# Patient Record
Sex: FEMALE | URBAN - METROPOLITAN AREA
[De-identification: names, ages, dates, MRNs, and addresses within clinical notes are randomized per-mention and may not be internally consistent; named-entity substitution may affect disease eponyms.]

---

## 2024-05-30 ENCOUNTER — TELEMEDICINE (OUTPATIENT)
Dept: NEUROLOGY | Facility: HOSPITAL | Age: 19
End: 2024-05-30
Payer: COMMERCIAL

## 2024-05-30 DIAGNOSIS — I95.1 ORTHOSTATIC SYNCOPE: ICD-10-CM

## 2024-05-30 DIAGNOSIS — I95.1 ORTHOSTATIC SYNCOPE: Primary | ICD-10-CM

## 2024-05-30 PROCEDURE — 1036F TOBACCO NON-USER: CPT | Performed by: PSYCHIATRY & NEUROLOGY

## 2024-05-30 PROCEDURE — 99215 OFFICE O/P EST HI 40 MIN: CPT | Mod: 95 | Performed by: PSYCHIATRY & NEUROLOGY

## 2024-05-30 PROCEDURE — 99417 PROLNG OP E/M EACH 15 MIN: CPT | Performed by: PSYCHIATRY & NEUROLOGY

## 2024-05-30 PROCEDURE — 99205 OFFICE O/P NEW HI 60 MIN: CPT | Performed by: PSYCHIATRY & NEUROLOGY

## 2024-05-30 RX ORDER — ALBUTEROL SULFATE 90 UG/1
2 AEROSOL, METERED RESPIRATORY (INHALATION) EVERY 4 HOURS PRN
COMMUNITY

## 2024-05-30 RX ORDER — FLUDROCORTISONE ACETATE 0.1 MG/1
1 TABLET ORAL DAILY
COMMUNITY
Start: 2023-07-26

## 2024-05-30 RX ORDER — LEVETIRACETAM 500 MG/1
500 TABLET ORAL 2 TIMES DAILY
COMMUNITY

## 2024-05-30 NOTE — PROGRESS NOTES
"[facility] AUTONOMIC PROGRAM         Makenzie Bridges MD    Professor of Neurology  [facility]  Senior Attending Physician- [facility] [place]  Director, Autonomic Program and Laboratories  Medical Director, [employer] [place] [address]  Office: [phone]  Assistant: Sylvia Nguyen (email: [email or web address])       AUTONOMIC NERVOUS SYSTEM CONSULTATION    Patient Information     Medical Record Number: [id number]   YOB: 2005    Home Address: [address] [place] [address]   Phone Number:  [phone]      Primary Care Physician: No primary care provider on file.    Referring Physician: No referring provider defined for this encounter.    Patient accompanied by: Her mother  In addition to attending physician, patient seen by: Vinh Dietz RN    Clinical Scores    Compass 31 : 26      IMPRESSION:    Sylvia's symptoms can be compatible with the postural orthostatic tachycardia syndrome, even though the latest bout of syncopal episodes happens when she was sitting or lying down in bed, which is unusual for POTS.  But because she has experienced orthostatic lightheadedness and dizziness in the past, POTS is to be worked up.    PLAN/RECOMMENDATIONS:     I will therefore bring her to  to get autonomic testing, an EMG and an etiological blood and urine work.  Those will be done over 2 days in a row.    Given the complaint of orthostatic intolerance, which, in the absence of a definite core cause, we will need to treat symptomatically. I would recommend following the \"5 conservative measures\":   1. Wearing waist-high compression stockings 40-50 mmHg in compression, every day as long she is up and about and removing them at bedtime  2. Drinking one gallon of water a day  3. Eating at least 6 gm of salt (roughly 3 teaspoons) throughout her day.  4. Elevating the head of " her bed 45 degrees (not by stacking pillows, but bending her mattress up and stacking bricks underneath).  5. Jogging in water 1 hour every day    These measures could be tried strictly for a period of 2 to 3 months, and if they do not bring relief, usage of symptomatic pharmacological treatment would then be justified.    She will wean herself off of Keppra and not restart Florinef.      MARII Murillo is a 18 y.o.  female presenting to the  Autonomic Program for the evaluation of orthostatic intolerance and lightheadedness, with episodes of syncope    History details     The most recent syncope happened 3 weeks ago when the patient was standing.  Prior to that, on April 21, the patient experienced about 12 consecutive episodes.  She was trying to get out of bed and every time she would try she would pass out.  This led her to the emergency room where they prescribed Keppra for seizure prophylaxis and Florinef.  She was worked up extensively from the cardiac standpoint and this workup was unremarkable.  Except that when the Holter monitor was placed it recorded heart rate as high as 200 and above beats per minute, that was sinus rhythm.  There may have been a tilt table test done that was reported as negative but the details are not available.    Relevant Past Medical History:    She was diagnosed with asthma.  Retrospectively she thinks that some of those symptoms that were called asthma may have been orthostatic related    Relevant Family History:   The patient is adopted and does not know her biological family  AUTONOMIC REVIEW OF SYSTEMS    COMPASS 31 for research purposes only (see below)  1 1. In the past year, have you ever felt faint, dizzy, “goofy”, or had difficulty thinking soon after standing up from a sitting or lying position? Yes   2 2. When standing up, how frequently do you get these feelings or symptoms? Frequently   1 3. How would you rate the severity of these feelings or  symptoms? Mild   2 4. In the past year, have these feelings or symptoms that you have experienced: Gotten somewhat worse   6     24     0 5. In the past year, have you ever noticed color changes in your skin, such as red, white, or purple? (If you answer no, please skip to question 8) No   0 6. What parts of your body are affected by these color changes?    0 7. Have these changes in your skin color:    0     0     0 8. In the past 5 years, what changes, if any, have occurred in your general body sweating? I haven't noticed any changes in my sweating   0 9. Do your eyes feel excessively dry? No   0 10. Does you mouth feel excessively dry? No   0 11. For the symptom of dry eyes or dry mouth that you have had for the longest period of time, has this symptom: I have not had any of these symptoms   0     0     0 12. In the past year, have you noticed any changes in how quickly you get full when eating a meal? I haven’t noticed any change   0 13. In the past year, have you felt excessively full or persistently full (bloated feeling) after a meal? Never   0 14. In the past year, have you vomited after a meal? Never   0 15. In the past year, have you had a cramping or colicky abdominal pain? Never   0 16. In the past year, have you had any bouts of diarrhea? (If you answer no, please skip to question 20) No   0 17. How frequently does this occur?    0 18. How severe are these bouts of diarrhea?    0 19. Have your bouts of diarrhea gotten:    0 20. In the past year, have you been constipated? (If you answer no, please skip to question 24) No   0 21. How frequently are you constipated?    0 22. How severe are these episodes of constipation?    0 23. Has your constipation gotten:    0     0     0 24. In the past year, have you ever lost control of your bladder function? Never   0 25. In the past year, have you had difficulty passing urine? Never   0 26. In the past year, have you had trouble completely emptying your bladder?  Never   0     0     1 27. In the past year, without sunglasses or tinted glasses, has bright light bothered your eyes? (If you roly never, please skip to question 29) Occasionally   2 28. How severe is this sensitivity to bright light? Moderate   1 29. In the past year, have you had trouble focusing your eyes? (If you roly never, please skip to question 31) Occasionally   1 30. How severe is this focusing problem? Mild   2 31. Has the most troublesome symptom with your eyes (i.e. sensitivity to bright light or trouble focusing) gotten: Gotten somewhat worse   7     [id number]          TOTAL     26 /100      GENERAL EXAMINATION      Overall appearance: NAD, well-nourished, well-kempt, and pleasant    NEUROLOGICAL EXAMINATION    Limited by the video encounter    A. Cognition and Language  1. Patient is alert, oriented to time, place and persons  2. Language normal  3. Dysarthria: not present  4. Memory: no apparent deficit  5. MMSE: N/A    B. Cranial Nerves  No apparent deficits    C. Motor examination (MRC [0 to 5] or modified MRC [pluses and minuses] classification)  Not performed  D. Sensory examination  Not performed  E. Reflexes (NINDS classification 0 to 4)    Not performed  F. Coordination    Not performed  G. Gait    Not performed    I spent 90 minutes with the patient, at least 50% of which were dedicated to education and detailing diagnostic plans and management.      Makenzie Bridges MD

## 2024-07-09 ENCOUNTER — HOSPITAL ENCOUNTER (OUTPATIENT)
Dept: NEUROLOGY | Facility: HOSPITAL | Age: 19
Discharge: HOME | End: 2024-07-09
Payer: COMMERCIAL

## 2024-07-09 DIAGNOSIS — I95.1 ORTHOSTATIC SYNCOPE: ICD-10-CM

## 2024-07-09 PROCEDURE — 95923 AUTONOMIC NRV SYST FUNJ TEST: CPT | Performed by: PSYCHIATRY & NEUROLOGY

## 2024-07-09 PROCEDURE — 95886 MUSC TEST DONE W/N TEST COMP: CPT | Performed by: PSYCHIATRY & NEUROLOGY

## 2024-07-09 PROCEDURE — 95924 ANS PARASYMP & SYMP W/TILT: CPT | Performed by: PSYCHIATRY & NEUROLOGY

## 2024-07-09 PROCEDURE — 95912 NRV CNDJ TEST 11-12 STUDIES: CPT | Performed by: PSYCHIATRY & NEUROLOGY

## 2024-07-10 ENCOUNTER — LAB (OUTPATIENT)
Dept: LAB | Facility: LAB | Age: 19
End: 2024-07-10
Payer: COMMERCIAL

## 2024-07-10 DIAGNOSIS — I95.1 ORTHOSTATIC SYNCOPE: ICD-10-CM

## 2024-07-10 LAB
ANION GAP SERPL CALC-SCNC: 12 MMOL/L (ref 10–20)
BASOPHILS # BLD AUTO: 0.03 X10*3/UL (ref 0–0.1)
BASOPHILS NFR BLD AUTO: 0.5 %
BUN SERPL-MCNC: 10 MG/DL (ref 6–23)
CALCIUM SERPL-MCNC: 9 MG/DL (ref 8.6–10.6)
CENTROMERE B AB SER-ACNC: <0.2 AI
CERULOPLASMIN SERPL-MCNC: 27.3 MG/DL (ref 20–60)
CHLORIDE SERPL-SCNC: 106 MMOL/L (ref 98–107)
CHROMATIN AB SERPL-ACNC: <0.2 AI
CO2 SERPL-SCNC: 24 MMOL/L (ref 21–32)
CREAT SERPL-MCNC: 0.67 MG/DL (ref 0.5–1.05)
CRP SERPL-MCNC: <0.1 MG/DL
DSDNA AB SER-ACNC: <1 IU/ML
EGFRCR SERPLBLD CKD-EPI 2021: >90 ML/MIN/1.73M*2
ENA JO1 AB SER QL IA: <0.2 AI
ENA RNP AB SER IA-ACNC: <0.2 AI
ENA SCL70 AB SER QL IA: <0.2 AI
ENA SM AB SER IA-ACNC: <0.2 AI
ENA SM+RNP AB SER QL IA: <0.2 AI
ENA SS-A AB SER IA-ACNC: <0.2 AI
ENA SS-B AB SER IA-ACNC: <0.2 AI
EOSINOPHIL # BLD AUTO: 0.11 X10*3/UL (ref 0–0.7)
EOSINOPHIL NFR BLD AUTO: 1.7 %
ERYTHROCYTE [DISTWIDTH] IN BLOOD BY AUTOMATED COUNT: 16.5 % (ref 11.5–14.5)
ERYTHROCYTE [SEDIMENTATION RATE] IN BLOOD BY WESTERGREN METHOD: 9 MM/H (ref 0–20)
GLIADIN PEPTIDE IGA SER IA-ACNC: <1 U/ML
GLUCOSE 1H P 75 G GLC PO SERPL-MCNC: 100 MG/DL
GLUCOSE 2H P 75 G GLC PO SERPL-MCNC: 116 MG/DL
GLUCOSE 3H P 75 G GLC PO SERPL-MCNC: 87 MG/DL
GLUCOSE P FAST SERPL-MCNC: 86 MG/DL
GLUCOSE SERPL-MCNC: 90 MG/DL (ref 74–99)
HCT VFR BLD AUTO: 35.8 % (ref 36–46)
HCYS SERPL-SCNC: 4.32 UMOL/L (ref 5–13.9)
HGB BLD-MCNC: 11 G/DL (ref 12–16)
IMM GRANULOCYTES # BLD AUTO: 0.01 X10*3/UL (ref 0–0.7)
IMM GRANULOCYTES NFR BLD AUTO: 0.2 % (ref 0–0.9)
KAPPA LC SERPL-MCNC: 1.49 MG/DL (ref 0.33–1.94)
KAPPA LC/LAMBDA SER: 0.97 {RATIO} (ref 0.26–1.65)
LAMBDA LC SERPL-MCNC: 1.54 MG/DL (ref 0.57–2.63)
LYMPHOCYTES # BLD AUTO: 2.38 X10*3/UL (ref 1.2–4.8)
LYMPHOCYTES NFR BLD AUTO: 36.7 %
MCH RBC QN AUTO: 24.9 PG (ref 26–34)
MCHC RBC AUTO-ENTMCNC: 30.7 G/DL (ref 32–36)
MCV RBC AUTO: 81 FL (ref 80–100)
MONOCYTES # BLD AUTO: 0.5 X10*3/UL (ref 0.1–1)
MONOCYTES NFR BLD AUTO: 7.7 %
NEUTROPHILS # BLD AUTO: 3.45 X10*3/UL (ref 1.2–7.7)
NEUTROPHILS NFR BLD AUTO: 53.2 %
NRBC BLD-RTO: 0 /100 WBCS (ref 0–0)
PLATELET # BLD AUTO: 283 X10*3/UL (ref 150–450)
POTASSIUM SERPL-SCNC: 4.1 MMOL/L (ref 3.5–5.3)
PROT SERPL-MCNC: 6.9 G/DL (ref 6.4–8.2)
RBC # BLD AUTO: 4.42 X10*6/UL (ref 4–5.2)
RHEUMATOID FACT SER NEPH-ACNC: <10 IU/ML (ref 0–15)
RIBOSOMAL P AB SER-ACNC: <0.2 AI
SODIUM SERPL-SCNC: 138 MMOL/L (ref 136–145)
T3FREE SERPL-MCNC: 3.4 PG/ML (ref 3–4.7)
T4 SERPL-MCNC: 5.9 UG/DL (ref 4.5–11.1)
THYROPEROXIDASE AB SERPL-ACNC: 30 IU/ML
TSH SERPL-ACNC: 2.14 MIU/L (ref 0.44–3.98)
TTG IGA SER IA-ACNC: <1 U/ML
VIT B12 SERPL-MCNC: 429 PG/ML (ref 211–911)
WBC # BLD AUTO: 6.5 X10*3/UL (ref 4.4–11.3)

## 2024-07-10 PROCEDURE — 82390 ASSAY OF CERULOPLASMIN: CPT

## 2024-07-10 PROCEDURE — 82542 COL CHROMOTOGRAPHY QUAL/QUAN: CPT

## 2024-07-10 PROCEDURE — 86376 MICROSOMAL ANTIBODY EACH: CPT

## 2024-07-10 PROCEDURE — 86596 VOLTAGE-GTD CA CHNL ANTB EA: CPT

## 2024-07-10 PROCEDURE — 86334 IMMUNOFIX E-PHORESIS SERUM: CPT

## 2024-07-10 PROCEDURE — 85025 COMPLETE CBC W/AUTO DIFF WBC: CPT

## 2024-07-10 PROCEDURE — 82952 GTT-ADDED SAMPLES: CPT

## 2024-07-10 PROCEDURE — 82525 ASSAY OF COPPER: CPT

## 2024-07-10 PROCEDURE — 86431 RHEUMATOID FACTOR QUANT: CPT

## 2024-07-10 PROCEDURE — 86255 FLUORESCENT ANTIBODY SCREEN: CPT

## 2024-07-10 PROCEDURE — 84446 ASSAY OF VITAMIN E: CPT

## 2024-07-10 PROCEDURE — 84165 PROTEIN E-PHORESIS SERUM: CPT | Performed by: PSYCHIATRY & NEUROLOGY

## 2024-07-10 PROCEDURE — 83921 ORGANIC ACID SINGLE QUANT: CPT

## 2024-07-10 PROCEDURE — 84165 PROTEIN E-PHORESIS SERUM: CPT

## 2024-07-10 PROCEDURE — 86738 MYCOPLASMA ANTIBODY: CPT

## 2024-07-10 PROCEDURE — 82085 ASSAY OF ALDOLASE: CPT

## 2024-07-10 PROCEDURE — 86320 SERUM IMMUNOELECTROPHORESIS: CPT | Performed by: PSYCHIATRY & NEUROLOGY

## 2024-07-10 PROCEDURE — 82384 ASSAY THREE CATECHOLAMINES: CPT

## 2024-07-10 PROCEDURE — 83521 IG LIGHT CHAINS FREE EACH: CPT

## 2024-07-10 PROCEDURE — 80048 BASIC METABOLIC PNL TOTAL CA: CPT

## 2024-07-10 PROCEDURE — 36415 COLL VENOUS BLD VENIPUNCTURE: CPT

## 2024-07-10 PROCEDURE — 86038 ANTINUCLEAR ANTIBODIES: CPT

## 2024-07-10 PROCEDURE — 83519 RIA NONANTIBODY: CPT

## 2024-07-10 PROCEDURE — 86140 C-REACTIVE PROTEIN: CPT

## 2024-07-10 PROCEDURE — 86800 THYROGLOBULIN ANTIBODY: CPT

## 2024-07-10 PROCEDURE — 83516 IMMUNOASSAY NONANTIBODY: CPT

## 2024-07-10 PROCEDURE — 84481 FREE ASSAY (FT-3): CPT

## 2024-07-10 PROCEDURE — 85652 RBC SED RATE AUTOMATED: CPT

## 2024-07-10 PROCEDURE — 82951 GLUCOSE TOLERANCE TEST (GTT): CPT

## 2024-07-10 PROCEDURE — 84443 ASSAY THYROID STIM HORMONE: CPT

## 2024-07-10 PROCEDURE — 84207 ASSAY OF VITAMIN B-6: CPT

## 2024-07-10 PROCEDURE — 84155 ASSAY OF PROTEIN SERUM: CPT

## 2024-07-10 PROCEDURE — 83090 ASSAY OF HOMOCYSTEINE: CPT

## 2024-07-10 PROCEDURE — 82607 VITAMIN B-12: CPT

## 2024-07-10 PROCEDURE — 86235 NUCLEAR ANTIGEN ANTIBODY: CPT

## 2024-07-10 PROCEDURE — 86225 DNA ANTIBODY NATIVE: CPT

## 2024-07-10 PROCEDURE — 84436 ASSAY OF TOTAL THYROXINE: CPT

## 2024-07-10 PROCEDURE — 82595 ASSAY OF CRYOGLOBULIN: CPT

## 2024-07-11 LAB
ALDOLASE SERPL-CCNC: 5 U/L (ref 1.2–7.6)
GLIADIN PEPTIDE IGG SER IA-ACNC: 0.64 FLU (ref 0–4.99)
THYROGLOB AB SERPL-ACNC: <0.9 IU/ML (ref 0–4)
TTG IGG SER IA-ACNC: <0.82 FLU (ref 0–4.99)

## 2024-07-12 LAB
A-TOCOPHEROL VIT E SERPL-MCNC: 9.2 MG/L (ref 5.5–18)
ANA SER QL HEP2 SUBST: NEGATIVE
BETA+GAMMA TOCOPHEROL SERPL-MCNC: 1.2 MG/L (ref 0–6)
COPPER SERPL-MCNC: 117.1 UG/DL (ref 80–155)
M PNEUMO IGG SER IA-ACNC: 0.53 U/L
M PNEUMO IGM SER IA-ACNC: 0.48 U/L
PCA IGG SER-ACNC: 15.4 UNITS (ref 0–24.9)

## 2024-07-13 LAB
3OH-DODECANOYLCARN SERPL-SCNC: 0.01 UMOL/L
3OH-ISOVALERYLCARN SERPL-SCNC: 0.02 UMOL/L
3OH-LINOLEOYLCARN SERPL-SCNC: <0.01 UMOL/L
3OH-OLEOYLCARN SERPL-SCNC: <0.01 UMOL/L
3OH-PALMITOLEYLCARN SERPL-SCNC: 0.01 UMOL/L
3OH-PALMITOYLCARN SERPL-SCNC: <0.01 UMOL/L
3OH-STEAROYLCARN SERPL-SCNC: <0.01 UMOL/L
3OH-TDECANOYLCARN SERPL-SCNC: <0.01 UMOL/L
3OH-TDECENOYLCARN SERPL-SCNC: 0.01 UMOL/L
ACETYLCARN SERPL-SCNC: 5.51 UMOL/L (ref 2.93–15.06)
ACYLCARNITINE PATTERN SERPL-IMP: NORMAL
BUTYRYL+ISOBUTYRYLCARN SERPL-SCNC: 0.14 UMOL/L
CARN ESTERS SERPL-SCNC: 8 UMOL/L (ref 3–38)
CARN ESTERS/C0 SERPL-SRTO: 0.2 RATIO (ref 0.1–0.9)
CARNITINE FREE SERPL-SCNC: 35 UMOL/L (ref 22–63)
CARNITINE SERPL-SCNC: 43 UMOL/L (ref 31–78)
DECANOYLCARN SERPL-SCNC: 0.16 UMOL/L
DECENOYLCARN SERPL-SCNC: 0.1 UMOL/L
DODECANOYLCARN SERPL-SCNC: 0.04 UMOL/L
DODECENOYLCARN SERPL-SCNC: 0.03 UMOL/L
GLUTARYLCARN SERPL-SCNC: 0.09 UMOL/L
HEXANOYLCARN SERPL-SCNC: 0.05 UMOL/L
ISOVALERYL+MEBUTYRYLCARN SERPL-SCNC: 0.08 UMOL/L
LINOLEOYLCARN SERPL-SCNC: 0.05 UMOL/L
OCTANOYLCARN SERPL-SCNC: 0.12 UMOL/L
OCTENOYLCARN SERPL-SCNC: 0.26 UMOL/L
OLEOYLCARN SERPL-SCNC: 0.08 UMOL/L
PALMITOLEYLCARN SERPL-SCNC: 0.01 UMOL/L
PALMITOYLCARN SERPL-SCNC: 0.08 UMOL/L
PROPIONYLCARN SERPL-SCNC: 0.27 UMOL/L
PYRIDOXAL PHOS SERPL-SCNC: 14.4 NMOL/L (ref 20–125)
SCAN RESULT: ABNORMAL
STEAROYLCARN SERPL-SCNC: 0.03 UMOL/L
TDECADIENOYLCARN SERPL-SCNC: 0.02 UMOL/L
TDECANOYLCARN SERPL-SCNC: 0.02 UMOL/L
TDECENOYLCARN SERPL-SCNC: 0.03 UMOL/L

## 2024-07-15 LAB
CRYOGLOB SER QL: NORMAL
SCAN RESULT: NORMAL

## 2024-07-16 LAB
ALBUMIN: 3.8 G/DL (ref 3.4–5)
ALPHA 1 GLOBULIN: 0.3 G/DL (ref 0.2–0.6)
ALPHA 2 GLOBULIN: 0.7 G/DL (ref 0.4–1.1)
BETA GLOBULIN: 1 G/DL (ref 0.5–1.2)
DOPAMINE SERPL-MCNC: <30 PG/ML (ref 0–48)
EPINEPH PLAS-MCNC: 34 PG/ML (ref 0–62)
GAMMA GLOBULIN: 1.1 G/DL (ref 0.5–1.4)
IMMUNOFIXATION COMMENT: NORMAL
METHYLMALONATE SERPL-SCNC: <0.2 UMOL/L (ref 0–0.4)
NOREPINEPH PLAS-MCNC: 328 PG/ML (ref 0–874)
PATH REVIEW - SERUM IMMUNOFIXATION: NORMAL
PATH REVIEW-SERUM PROTEIN ELECTROPHORESIS: NORMAL
PROTEIN ELECTROPHORESIS COMMENT: NORMAL

## 2024-07-17 LAB — SCAN RESULT: NORMAL

## 2024-07-19 LAB
AMPHIPHYSIN IGG SER QL IA: NEGATIVE
ANNOTATION COMMENT IMP: NORMAL
CV2 AB SERPL QL IF: NEGATIVE
GLIAL NUC TYPE 1 AB SER QL IF: NEGATIVE
HU1 AB SER QL: NEGATIVE
HU2 AB SER QL IF: NEGATIVE
HU3 AB SER QL: NEGATIVE
PARANEOPLASTIC AB SER-IMP: NORMAL
PCA-1 AB SER QL IF: NEGATIVE
PCA-2 AB SER QL IF: NEGATIVE
PCA-TR AB SER QL IF: NEGATIVE
VGCC-P/Q BIND IGG+IGM SER IA-SCNC: 0 NMOL/L
VGKC IGG+IGM SER IA-SCNC: 0 NMOL/L

## 2024-07-23 LAB — SCAN RESULT: NORMAL

## 2024-08-27 ENCOUNTER — APPOINTMENT (OUTPATIENT)
Dept: NEUROLOGY | Facility: HOSPITAL | Age: 19
End: 2024-08-27
Payer: COMMERCIAL

## 2024-10-24 ENCOUNTER — TELEMEDICINE (OUTPATIENT)
Dept: NEUROLOGY | Facility: HOSPITAL | Age: 19
End: 2024-10-24
Payer: COMMERCIAL

## 2024-10-24 DIAGNOSIS — I95.1 ORTHOSTATIC SYNCOPE: Primary | ICD-10-CM

## 2024-10-24 DIAGNOSIS — E53.1 VITAMIN B6 DEFICIENCY: ICD-10-CM

## 2024-10-24 PROCEDURE — 99214 OFFICE O/P EST MOD 30 MIN: CPT | Performed by: PSYCHIATRY & NEUROLOGY

## 2024-10-24 PROCEDURE — 99214 OFFICE O/P EST MOD 30 MIN: CPT | Mod: 95 | Performed by: PSYCHIATRY & NEUROLOGY

## 2024-10-24 RX ORDER — LANOLIN ALCOHOL/MO/W.PET/CERES
50 CREAM (GRAM) TOPICAL DAILY
Qty: 30 TABLET | Refills: 0 | Status: SHIPPED | OUTPATIENT
Start: 2024-10-24 | End: 2024-11-23

## 2024-10-24 NOTE — PROGRESS NOTES
[facility] AUTONOMIC PROGRAM    AUTONOMIC FOLLOW-UP VISIT      Makenzie Bridges MD  Professor of Neurology  [facility]  Senior Attending Physician- [facility] [place]  Director, Autonomic Program  Medical Director, Center for [employer] and [employer] [place]  [place] [address]  Office: [phone]  Assistant: Sylvia Nguyen (email: [email or web address])     Patient Information     Medical Record Number: [id number]   YOB: 2005    Home Address: [address] [place] [address]   Phone Number:  [phone]      Primary Care Physician: No primary care provider on file.    Referring Physician: No referring provider defined for this encounter.    Patient accompanied by: Her mother  In addition to attending physician, patient seen by: MIGUELANGEL Kent    Clinical Scores    Compass 31 (for research purposes only):   Previous Compass 31: 26      IMPRESSION:  Sylvia is 19 yo female who presents for follow-up of her autonomic testing and lab work. Her autonomic testing showed the presence of a symptomatic accentuated orthostatic tachycardia followed by a vasovagal event. Her lab work was abnormal with low vitamin B6 which can lead to an autonomic neuropathy. Additionally she had a low Coenzyme Q10 which could be indicative of mitochondrial dysfunction. She is still having syncopal episodes and autonomic symptoms.      Status of the 5 conservatives:  Since the last visit patient states that she has increased her water intake, has added more salt to her diet, taking electrolytes, sleeping with a wedge pillow, and sometimes using knee high compression stockings.     Status of the symptoms:  Most recent syncopal episode was this past week while standing for a long time during choir. Reports about 6 weeks where she went to the emergency department after having multiple syncopal episodes in a 2 hour time span.      Findings on testing:   Autonomic testing showed an abnormal cardiovascular autonomic test panel, due to the presence of a symptomatic accentuated orthostatic tachycardia, not sufficient for the diagnosis of Postural Orthostatic Tachycardia Syndrome (POTS), followed by a vasovagal event. There is no evidence of a significant cardiovagal or cardiovascular adrenergic abnormality on the cardioautonomic reflex tests. Specifically, there is no evidence of orthostatic hypotension.   In addition, there is no evidence of a postganglionic sympathetic sudomotor abnormality like that seen in autonomic/small fiber neuropathy. QSART was normal.    EMG and nerve conduction were normal studies.     PLAN/RECOMMENDATIONS:   -Will start mitochondrial cocktail. Information will be sent over [email or web address].   -Take vitamin B6, 50mg daily for one month. Will recheck level in 3 months.   -The 5 conservative measures were reviewed in depth with patient and adjustments will be made to maximize the benefits of these therapies which include:   Tracking exactly how much water she is taking daily (at least 120 oz)   Taking at least 6 grams (or 3 tsp) of salt daily. Making sure to measure in the morning the full amount and spreading that out during her day.   Propping the head of the mattress at least 45 degrees while sleeping   Using compression stockings thigh high (at least 40-50 mmHg) daily while awake. Prescription will be sent to her mailing address.    Finding a pool to use either at school or a community recreation center to perform water jogging for one hour a day.   - Follow-up to be scheduled in 3 months.     MARII BACONSimeon Murillo is a 18 y.o. y/o female presenting to the  Autonomic Program for a follow-up on autonomic work-up. This is follow-up visit number 1.       Interval History details   Her most recent syncopal episode was this past week while standing for a long time during choir. She reports about 6 weeks where  she went to the emergency department after having multiple syncopal episodes in a 2 hour time span. This was her worst episode since July of this past year.     Initial History  Syncope (up to 12 episodes in a day) upon getting out of bed. Given Gerson. Neg cardiac.     Erik Jacobo, APRN-CNP    I spent 40 minutes with the patient, at least 50% of which were spent on treatment management and education.    Makenzie Bridges MD

## 2024-10-25 DIAGNOSIS — I95.1 ORTHOSTATIC SYNCOPE: ICD-10-CM

## 2025-01-10 DIAGNOSIS — E53.1 VITAMIN B6 DEFICIENCY: Primary | ICD-10-CM

## 2025-03-13 ENCOUNTER — TELEMEDICINE (OUTPATIENT)
Dept: NEUROLOGY | Facility: HOSPITAL | Age: 20
End: 2025-03-13
Payer: COMMERCIAL

## 2025-03-13 DIAGNOSIS — G90.A POTS (POSTURAL ORTHOSTATIC TACHYCARDIA SYNDROME): Primary | ICD-10-CM

## 2025-03-13 PROCEDURE — 99214 OFFICE O/P EST MOD 30 MIN: CPT | Mod: 95 | Performed by: PSYCHIATRY & NEUROLOGY

## 2025-03-13 PROCEDURE — 99214 OFFICE O/P EST MOD 30 MIN: CPT | Performed by: PSYCHIATRY & NEUROLOGY

## 2025-03-13 RX ORDER — PROPRANOLOL HYDROCHLORIDE 10 MG/1
10 TABLET ORAL 2 TIMES DAILY
Qty: 180 TABLET | Refills: 3 | Status: SHIPPED | OUTPATIENT
Start: 2025-03-13 | End: 2026-03-13

## 2025-03-13 NOTE — PROGRESS NOTES
[facility] AUTONOMIC PROGRAM    AUTONOMIC FOLLOW-UP VISIT      Makenzie Bridges MD  Professor of Neurology  [facility]  Senior Attending Physician- [facility] [place]  Director, Autonomic Program  Medical Director, Center for [facility] [place] [address]  Office: [phone]  Assistant: Sylvia Nguyen (email: [email or web address])     Patient Information     Medical Record Number: [id number]   YOB: 2005    Home Address: [address] [place] [address]   Phone Number:  [phone]      Primary Care Physician: No primary care provider on file.    Referring Physician: No referring provider defined for this encounter.    Patient accompanied by:   In addition to attending physician, patient seen by:     Clinical Scores    Compass 31 (for research purposes only):   Previous Compass 31 (date 5/30/24): 26     IMPRESSION:  Sylvia is a 19 year-old-female with a history of POTS confirmed on autonomic testing in July 2024. Previous lab work was abnormal with low vitamin B6 which can lead to an autonomic neuropathy and after supplementation she is in normal range. She had a low Coenzyme Q10 which could be indicative of mitochondrial dysfunction and was prescribed the mitochondrial cocktail. She is still having syncopal episodes and autonomic symptoms where she is passing out 2-3 times per week and having flares of her POTS symptoms where she will have elevated heart rate in the 180s-200s. She is on propranolol 5 mg BID which is providing some benefit. She has implemented some of the conservative measures, but there is room to maximize.       PLAN/RECOMMENDATIONS:   -Maximize conservative measures. Continue drinking at least 100 oz water daily, eat a pickle during the day in addition to the salt you are adding, continue with compression stockings and consider using the waist high for  maximum benefit, continue elevating the head of the bed, we recommend water jogging for exercise to get maximum benefit  -We will try increasing her propranolol to 10 mg twice daily. Send us a message in 2 weeks and let us know how this dose is working  -We recommend continuing the mitochondrial cocktail    Erik Jacobo, MIGUELANGEL-CNP    Status of the 5 conservatives:  Drinking 3-4 32 oz water bottles a day.  Wearing knee high compression stockings  Adding about 1.5 tsp salt to her food daily. Taking salt tablets. Using liquid IV for electrolytes  Sleeps with head elevated on wedge pillow  Walking and running when able    Status of the symptoms:  Symptoms improved from passing out 12 times a day to just 2-3 times per week. Still having POTS flares where she will pass out and have elevated HR for multiple hours.     Findings on testing:   -Repeat vitamin B 6 after supplementation was normal    HPI    Sylvia Murillo is a 19 y.o. y/o  female presenting to the  Autonomic Program for a follow-up on autonomic work-up. This is follow-up visit 2. We last saw her 10/24/2024.    Interval History details   Was taking the hany cocktail, she didn't notice any difference. The one thing that does seem to help propranolol 5 mg twice daily.   Symptoms have been not great because of stress with school. She is passing out 2-3 times per week. Previously she was passing out 12 times a day. She will have some days where her symptoms are well controlled and she is not having any lightheadedness or dizziness. She does still have flares of her POTS symptoms throughout the month where she will have HR in 180s-200s and pass out multiple times within a few hours. Most recently was this past weekend when she had a fever, she had episodes of shaking and increased HR. She was previously on Keppra last year for concern of seizure activity. Has been cleared by neurology, and has since discontinued this.     Initial History  Syncope  (up to 12 episodes in a day) upon getting out of bed. Given Keppra and Nadineonef. Neg cardiac.     I spent 30 minutes with the patient, at least 50% of which were spent on treatment management and education.    Makenzie Bridges MD

## 2025-06-24 DIAGNOSIS — G90.A POTS (POSTURAL ORTHOSTATIC TACHYCARDIA SYNDROME): ICD-10-CM

## 2025-06-24 RX ORDER — PROPRANOLOL HYDROCHLORIDE 10 MG/1
10 TABLET ORAL 2 TIMES DAILY
Qty: 180 TABLET | Refills: 3 | Status: SHIPPED | OUTPATIENT
Start: 2025-06-24 | End: 2026-06-24